# Patient Record
Sex: FEMALE | URBAN - METROPOLITAN AREA
[De-identification: names, ages, dates, MRNs, and addresses within clinical notes are randomized per-mention and may not be internally consistent; named-entity substitution may affect disease eponyms.]

---

## 2022-10-20 ENCOUNTER — OFFICE VISIT (OUTPATIENT)
Dept: URBAN - METROPOLITAN AREA CLINIC 23 | Facility: CLINIC | Age: 27
End: 2022-10-20
Payer: MEDICAID

## 2022-10-20 DIAGNOSIS — H00.15 CHALAZION LEFT LOWER LID: Primary | ICD-10-CM

## 2022-10-20 PROCEDURE — 99202 OFFICE O/P NEW SF 15 MIN: CPT | Performed by: OPTOMETRIST

## 2022-10-20 NOTE — IMPRESSION/PLAN
Impression: Chalazion left lower lid: H00.15. Left. Condition: will likely improve. Plan: Discussed findings. Everted lids, no foreign body or NaFl staining noted. Possible new chalazion beginning. Recommend pickup up ointment given by [facility] for comfort. Call if symptoms worsen.